# Patient Record
Sex: FEMALE | Race: WHITE | NOT HISPANIC OR LATINO | Employment: OTHER | URBAN - METROPOLITAN AREA
[De-identification: names, ages, dates, MRNs, and addresses within clinical notes are randomized per-mention and may not be internally consistent; named-entity substitution may affect disease eponyms.]

---

## 2023-09-26 ENCOUNTER — NURSE TRIAGE (OUTPATIENT)
Dept: NURSING | Facility: CLINIC | Age: 68
End: 2023-09-26
Payer: MEDICARE

## 2023-09-26 ENCOUNTER — APPOINTMENT (OUTPATIENT)
Dept: GENERAL RADIOLOGY | Facility: CLINIC | Age: 68
End: 2023-09-26
Attending: EMERGENCY MEDICINE
Payer: MEDICARE

## 2023-09-26 ENCOUNTER — HOSPITAL ENCOUNTER (EMERGENCY)
Facility: CLINIC | Age: 68
Discharge: HOME | End: 2023-09-26
Attending: EMERGENCY MEDICINE | Admitting: EMERGENCY MEDICINE
Payer: MEDICARE

## 2023-09-26 ENCOUNTER — APPOINTMENT (OUTPATIENT)
Dept: CT IMAGING | Facility: CLINIC | Age: 68
End: 2023-09-26
Attending: EMERGENCY MEDICINE
Payer: MEDICARE

## 2023-09-26 VITALS
DIASTOLIC BLOOD PRESSURE: 91 MMHG | TEMPERATURE: 97.3 F | SYSTOLIC BLOOD PRESSURE: 184 MMHG | OXYGEN SATURATION: 99 % | RESPIRATION RATE: 16 BRPM | HEART RATE: 87 BPM

## 2023-09-26 DIAGNOSIS — S80.01XA CONTUSION OF RIGHT KNEE, INITIAL ENCOUNTER: ICD-10-CM

## 2023-09-26 DIAGNOSIS — S00.83XA FOREHEAD CONTUSION, INITIAL ENCOUNTER: ICD-10-CM

## 2023-09-26 DIAGNOSIS — S09.90XA CLOSED HEAD INJURY, INITIAL ENCOUNTER: ICD-10-CM
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
## 2023-09-26 DIAGNOSIS — S60.222A CONTUSION OF LEFT HAND, INITIAL ENCOUNTER: ICD-10-CM

## 2023-09-26 PROCEDURE — 70450 CT HEAD/BRAIN W/O DYE: CPT | Mod: MA

## 2023-09-26 PROCEDURE — 73130 X-RAY EXAM OF HAND: CPT | Mod: LT

## 2023-09-26 PROCEDURE — 99284 EMERGENCY DEPT VISIT MOD MDM: CPT | Mod: 25

## 2023-09-26 ASSESSMENT — ACTIVITIES OF DAILY LIVING (ADL): ADLS_ACUITY_SCORE: 35

## 2023-09-26 NOTE — TELEPHONE ENCOUNTER
"Triage Call:    Caller: Patient  Patient tripped over a hose and fell and hit her face on the sidewalk around 11:00-11:30 today.  She has a lump on her head and it only hurts when her hair hits it or putting any ice on it.  The area is swelling is getting larger, is over 2 inches. It is her whole 1st finger.   She is not having nausea or vomiting.      Protocol Recommended Disposition: ED,  She will got to [facility] ED.      Caller verbalized understanding of instructions and questions answered.      Mayte Beasley RN on 9/26/2023 at 6:56 PM      Reason for Disposition   Large swelling or bruise > 2 inches (5 cm)    Additional Information   Negative: [1] ACUTE NEURO SYMPTOM AND [2] present now  (DEFINITION: difficult to awaken OR confused thinking and talking OR slurred speech OR weakness of arms OR unsteady walking)   Negative: Knocked out (unconscious) > 1 minute   Negative: Seizure (convulsion) occurred  (Exception: Prior history of seizures and now alert and without Acute Neuro Symptoms.)   Negative: Penetrating head injury (e.g., knife, gun shot wound, metal object)   Negative: [1] Major bleeding (e.g., actively dripping or spurting) AND [2] can't be stopped   Negative: [1] Dangerous mechanism of injury (e.g., MVA, diving, trampoline, contact sports, fall > 10 feet or 3 meters) AND [2] NECK pain AND [3] began < 1 hour after injury   Negative: Sounds like a life-threatening emergency to the triager   Negative: Can't remember what happened (amnesia)   Negative: Vomiting once or more   Negative: [1] Loss of vision or double vision AND [2] present now   Negative: Watery or blood-tinged fluid dripping from the NOSE or EARS now  (Exception: Tears from crying or nosebleed from nasal trauma.)   Negative: [1] One or two \"black eyes\" (bruising, purple color of eyelids) AND [2] onset within 24 hours of head injury    Protocols used: Head Injury-A-AH    "

## 2023-09-27 NOTE — ED PROVIDER NOTES
History     Chief Complaint:  Fall       HPI   Paris Conteh is a 67 year old female on Xarelto who presents after mechanical fall.  She was out walking and she tripped and fell and struck her head.  She did not have any loss of consciousness.  No headaches, seizures or vomiting.  No lacerations.  She has some mild pain in her left hand and some very mild pain in her right knee.  She has been ambulating without difficulty on her knee.  No neck pain or back pain.  She called the nurse triage and was told to come to the emergency department because she is on a DOAC.    Independent Historian:   None - Patient Only    Review of External Notes:   Reviewed cardiology outpatient note from 5/13/2022.  Patient is a history of persistent atrial fibrillation and hypertension.  She is on Xarelto, Zocor, metoprolol, lisinopril and chlorthalidone.    Medications:    Xarelto, Zocor, metoprolol, lisinopril and chlorthalidone.      Past Medical History:      Hypertension, persistent atrial fibrillation, dyslipidemia    Past Surgical History:    Noncontributory    Physical Exam   Patient Vitals for the past 24 hrs:   BP Temp Temp src Pulse Resp SpO2   09/26/23 2052 (!) 184/91 97.3  F (36.3  C) Oral 87 16 99 %        Physical Exam  General: Well-nourished, no acute distress  Eyes: PERRL, conjunctivae pink no scleral icterus or conjunctival injection  ENT:  Moist mucus membranes, posterior oropharynx clear without erythema or exudates, no hemotympanum  Respiratory:  Lungs clear to auscultation bilaterally, no crackles/rubs/wheezes.  Good air movement.  No seatbelt sign or ecchymoses  CV: Normal rate and rhythm, no murmurs/rubs/gallops  GI:  Abdomen soft and non-distended.  Normoactive BS.  No tenderness, guarding or rebound  Skin: Warm, dry.  Hematoma and contusion over the left forehead.  No lacerations.Large ecchymoses over the lateral aspect of the left hand.  Musculoskeletal: Mild tenderness over the lateral aspect of the  left hand.  Normal range of motion in all the joints of the hand.  No peripheral edema or calf tenderness.  No midline tenderness of the cervical/thoracic/lumbar spine.  No tenderness/crepitus/bony stepoffs over the clavicles, chest wall, pelvis, remainder of arms or legs.  Neuro: Alert and oriented to person/place/time  Psychiatric: Normal affect      Emergency Department Course     Imaging:  CT Head w/o Contrast   Final Result   IMPRESSION:   1.  No acute intracranial process.      XR Hand Left G/E 3 Views   Final Result   IMPRESSION: No fracture or dislocation. Moderate degenerative changes at the first CMC joint.         Report per radiology    Emergency Department Course & Assessments:    Interventions:  Medications - No data to display     Assessments:  I evaluated and assessed the patient.  Discussed the plan for imaging.  She was in agreement.  I reassessed the patient and discussed her imaging results with her as well as the plan for discharge.  She declined concussion  referral.    Independent Interpretation (X-rays, CTs, rhythm strip):  I reviewed and interpreted the head CT and see no evidence of intracranial hemorrhage.  I reviewed and interpreted the left hand x-ray.  I see no evidence of fracture or dislocation with special attention to the location of the patient's pain and tenderness.      Consultations/Discussion of Management or Tests:  None        Social Determinants of Health affecting care:   None    Disposition:  The patient was discharged to home.     Impression & Plan      Medical Decision Making:  Paris Conteh is a 67 year old female  who sustained a mechanical fall and closed head injury.  She is on a DOAC.  This is high risk and so head CT was obtained.  Fortunately, no signs of intracranial hemorrhage or skull fracture.  X-rays of her left hand were obtained given some tenderness there.  Fortunately, this is also normal without signs of fracture.  I suspect these are both  contusions.  Her neuro exam is otherwise normal remainder of head to toe trauma examination is reassuring.  At this time, with reasonable clinical confidence, I do leave she safe for discharge home.  She was given appropriate return precautions and asked to follow-up with her doctor in the next few days.    Diagnosis:    ICD-10-CM    1. Forehead contusion, initial encounter  S00.83XA       2. Closed head injury, initial encounter  S09.90XA       3. Fall, initial encounter  W19.XXXA       4. Contusion of left hand, initial encounter  S60.222A       5. Contusion of right knee, initial encounter  S80.01XA            Discharge Medications:  New Prescriptions    No medications on file        9/26/2023   Suyapa Gandhi MD Cho, Amy C, MD  09/26/23 2744

## 2023-09-27 NOTE — ED TRIAGE NOTES
Patient  fell at 11 am today.she stated she hit her head when she fell today. She denies LOC. Patient has hematoma to her forehead. She is currently on [facility]     Triage Assessment       Row Name 09/26/23 2051       Triage Assessment (Adult)    Airway WDL WDL       Respiratory WDL    Respiratory WDL WDL       Skin Circulation/Temperature WDL    Skin Circulation/Temperature WDL WDL       Cardiac WDL    Cardiac WDL WDL       Peripheral/Neurovascular WDL    Peripheral Neurovascular WDL WDL       Cognitive/Neuro/Behavioral WDL    Cognitive/Neuro/Behavioral WDL WDL

## 2023-09-27 NOTE — DISCHARGE INSTRUCTIONS
*No sports or activities that put you at risk for a repeat head injury until you have been without symptoms for 1 week.   *Tylenol as directed as needed for pain.  *Follow-up with your doctor for a recheck in 2-3 days.  *Return if you develop worsening headache, recurrent vomiting, seizures, neurologic changes or become worse in any way.    Discharge Instructions  Head Injury    You have been seen today for a head injury. You were checked for serious problems, like bleeding on the brain, but these problems cannot always be found right away.  Due to this risk, you should not be alone for 24 hours after your injury.  Follow up with your regular physician in 2-3 days. If you are taking a blood thinner, such as aspirin, Pradaxa  (dabigatran), Coumadin  (warfarin), or Plavix  (clopidogrel), you are at especially high risk for immediate or delayed bleeding, and need to re-check with a physician in 24 hours, or sooner if any of the symptoms below happen.     Return to the Emergency Department if:  You are confused, have amnesia, or you are not acting right.  Your headache gets worse or you start to have a really bad headache even with your recommended treatment plan.  You vomit more than once.  You have a convulsion or seizure.  You have trouble walking.  You have weakness or paralysis in an arm or a leg.  You have blood or fluid coming from your ears or nose.  You have new symptoms or anything that worries you.    Sleeping:  It is okay for you to sleep, but someone should wake you up as instructed by your doctor, and someone should check on you at your usual time to wake up.     Activity:  Do not drive for at least 24 hours.  Do not drive if you have dizzy spells or trouble concentrating, or remembering things.  Do not return to any contact sports until cleared by your regular doctor.     Follow-up:  It is very important that you make an appointment with your clinic and go to the appointment.  If you do not follow-up with  your regular doctor, it may result in missing an important development which could result in permanent injury or disability and/or lasting pain.  If there is any problem keeping your appointment, call your doctor or return to the Emergency Department.    MORE INFORMATION:    Concussion:  A concussion is a minor head injury that may cause temporary problems with the way your brain works.  Some symptoms include:  confusion, amnesia, nausea and vomiting, dizziness, fatigue, memory or concentration problems, irritability and sleep problems.    CT Scans: Your evaluation today may have included a CT scan (CAT scan) to look for things like bleeding or a skull fracture (break).  CT scans involve radiation and too many CT scans can cause serious health problems like cancer, especially in children.  Because of this, your doctor may not have ordered a CT scan today if they think you are at low risk for a serious or life threatening problem.    If you were given a prescription for medicine here today, be sure to read all of the information (including the package insert) that comes with your prescription.  This will include important information about the medicine, its side effects, and any warnings that you need to know about.  The pharmacist who fills the prescription can provide more information and answer questions you may have about the medicine.  If you have questions or concerns that the pharmacist cannot address, please call or return to the Emergency Department.     Opioid Medication Information    Pain medications are among the most commonly prescribed medicines, so we are including this information for all our patients. If you did not receive pain medication or get a prescription for pain medicine, you can ignore it.     You may have been given a prescription for an opioid (narcotic) pain medicine and/or have received a pain medicine while here in the Emergency Department. These medicines can make you drowsy or  impaired. You must not drive, operate dangerous equipment, or engage in any other dangerous activities while taking these medications. If you drive while taking these medications, you could be arrested for DUI, or driving under the influence. Do not drink any alcohol while you are taking these medications.     Opioid pain medications can cause addiction. If you have a history of chemical dependency of any type, you are at a higher risk of becoming addicted to pain medications.  Only take these prescribed medications to treat your pain when all other options have been tried. Take it for as short a time and as few doses as possible. Store your pain pills in a secure place, as they are frequently stolen and provide a dangerous opportunity for children or visitors in your house to start abusing these powerful medications. We will not replace any lost or stolen medicine.  As soon as your pain is better, you should flush all your remaining medication.     Many prescription pain medications contain Tylenol  (acetaminophen), including Vicodin , Tylenol #3 , Norco , Lortab , and Percocet .  You should not take any extra pills of Tylenol  if you are using these prescription medications or you can get very sick.  Do not ever take more than 3000 mg of acetaminophen in any 24 hour period.    All opioids tend to cause constipation. Drink plenty of water and eat foods that have a lot of fiber, such as fruits, vegetables, prune juice, apple juice and high fiber cereal.  Take a laxative if you don t move your bowels at least every other day. Miralax , Milk of Magnesia, Colace , or Senna  can be used to keep you regular.      Remember that you can always come back to the Emergency Department if you are not able to see your regular doctor in the amount of time listed above, if you get any new symptoms, or if there is anything that worries you.